# Patient Record
Sex: FEMALE | Race: WHITE | Employment: FULL TIME | URBAN - METROPOLITAN AREA
[De-identification: names, ages, dates, MRNs, and addresses within clinical notes are randomized per-mention and may not be internally consistent; named-entity substitution may affect disease eponyms.]

---

## 2017-04-24 ENCOUNTER — TELEPHONE (OUTPATIENT)
Dept: FAMILY MEDICINE | Facility: CLINIC | Age: 32
End: 2017-04-24

## 2017-04-24 ENCOUNTER — OFFICE VISIT (OUTPATIENT)
Dept: FAMILY MEDICINE | Facility: CLINIC | Age: 32
End: 2017-04-24
Payer: COMMERCIAL

## 2017-04-24 VITALS
DIASTOLIC BLOOD PRESSURE: 71 MMHG | TEMPERATURE: 98.6 F | HEART RATE: 63 BPM | HEIGHT: 69 IN | WEIGHT: 215 LBS | OXYGEN SATURATION: 99 % | SYSTOLIC BLOOD PRESSURE: 123 MMHG | BODY MASS INDEX: 31.84 KG/M2

## 2017-04-24 DIAGNOSIS — R50.9 FEVER, UNSPECIFIED FEVER CAUSE: Primary | ICD-10-CM

## 2017-04-24 LAB
FLUAV+FLUBV AG SPEC QL: NEGATIVE
FLUAV+FLUBV AG SPEC QL: NORMAL
SPECIMEN SOURCE: NORMAL

## 2017-04-24 PROCEDURE — 87804 INFLUENZA ASSAY W/OPTIC: CPT | Performed by: PHYSICIAN ASSISTANT

## 2017-04-24 PROCEDURE — 99213 OFFICE O/P EST LOW 20 MIN: CPT | Performed by: PHYSICIAN ASSISTANT

## 2017-04-24 RX ORDER — PENICILLIN V POTASSIUM 500 MG/1
TABLET, FILM COATED ORAL
Refills: 0 | COMMUNITY
Start: 2017-04-20 | End: 2019-04-02

## 2017-04-24 NOTE — LETTER
[facility]  [address] [facility] [address]  Phone: [phone]    April 24, 2017        Soniya Wheat  [address]  [facility] [address]          To whom it may concern:    RE: Soniya Wheat    Patient was seen and treated today at our clinic and missed work. She will be able to return after she no longer has a fever and cough has improved.     Please contact me for questions or concerns.      Sincerely,        Kristen M. Kehr, PA-C

## 2017-04-24 NOTE — PROGRESS NOTES
SUBJECTIVE:                                                    Soniya Wheat is a 31 year old female who presents to clinic today for the following health issues:    ENT Symptoms             Symptoms: cc Present Absent Comment   Fever/Chills  x     Fatigue  x     Muscle Aches  x     Eye Irritation   x    Sneezing   x    Nasal Pepito/Drg   x    Sinus Pressure/Pain   x    Loss of smell   x    Dental pain  x     Sore Throat   x    Swollen Glands   x    Ear Pain/Fullness  x     Cough   x    Wheeze   x    Chest Pain   x    Shortness of breath   x    Rash   x    Other         Symptom duration: Started last Friday ,got antibiotics ,has 2 abscess teeth which is why on antibiotic   Symptom severity:  moderate   Treatments tried:  antibiotic,ibuprofen   Contacts:  none           Problem list and histories reviewed & adjusted, as indicated.  Additional history: as documented    Patient Active Problem List   Diagnosis     Seborrheic dermatitis     Bilateral low back pain without sciatica     Past Surgical History:   Procedure Laterality Date     NO HISTORY OF SURGERY         Social History   Substance Use Topics     Smoking status: Current Every Day Smoker     Packs/day: 0.50     Years: 15.00     Smokeless tobacco: Former User     Quit date: 6/29/2015     Alcohol use 0.0 oz/week     0 Standard drinks or equivalent per week      Comment: rarely     Family History   Problem Relation Age of Onset     DIABETES Mother      Coronary Artery Disease Mother      Hypertension Mother      Hypertension Paternal Grandmother      CEREBROVASCULAR DISEASE Paternal Grandmother      Coronary Artery Disease Paternal Aunt      Anxiety Disorder Paternal Aunt      Depression Paternal Uncle      Asthma Son          Current Outpatient Prescriptions   Medication Sig Dispense Refill     penicillin V potassium (VEETID) 500 MG tablet   0     No Known Allergies    Reviewed and updated as needed this visit by clinical staff  Tobacco  Allergies  Meds   "Med Hx  Surg Hx  Fam Hx  Soc Hx      Reviewed and updated as needed this visit by Provider         ROS:  Constitutional, HEENT, cardiovascular, pulmonary, gi and gu systems are negative, except as otherwise noted.    OBJECTIVE:                                                    /71  Pulse 63  Temp 98.6  F (37  C) (Oral)  Ht 5' 9\" (1.753 m)  Wt 215 lb (97.5 kg)  LMP 02/13/2017 (Approximate)  SpO2 99%  BMI 31.75 kg/m2  Body mass index is 31.75 kg/(m^2).  GENERAL: healthy, alert and no distress  EYES: Eyes grossly normal to inspection, PERRL and conjunctivae and sclerae normal  HENT: ear canals and TM's normal, nose and mouth without ulcers or lesions  NECK: no adenopathy, no asymmetry, masses, or scars and thyroid normal to palpation  RESP: lungs clear to auscultation - no rales, rhonchi or wheezes  CV: regular rate and rhythm, normal S1 S2, no S3 or S4, no murmur, click or rub, no peripheral edema and peripheral pulses strong  MS: no gross musculoskeletal defects noted, no edema  SKIN: no suspicious lesions or rashes  NEURO: Normal strength and tone, mentation intact and speech normal  PSYCH: mentation appears normal, affect normal/bright    Diagnostic Test Results:  pending     ASSESSMENT/PLAN:                                                      1. Fever, unspecified fever cause  She will continue on the penicillin for the dental abscess, the pain is improving, she has only been taking it for 3 days.   Check influenza testing. I will contact her with results.   Continue with symptomatic treatments with OTC medications also, rest and fluids.   - Influenza A/B antigen      Kristen M. Kehr, PA-C  [facility]  "

## 2017-04-24 NOTE — MR AVS SNAPSHOT
After Visit Summary   4/24/2017    Soniya Wheat    MRN: [id number]           Patient Information     Date Of Birth          1985        Visit Information        Provider Department      4/24/2017 12:00 PM Kehr, Kristen M, PA-C [facility]        Today's Diagnoses     Fever, unspecified fever cause    -  1      Care Instructions    Upper respiratory infections are usually caused by viruses and, sometimes certain bacteria.  Antibiotics don't help the vast majority of people recover any quicker even when caused by a bacteria.  The body will fight this infection but it needs to be treated well in order to help heal itself.  Rest as needed.  It is ok to reduce food intake if appetite is poor but it is quite important to maintain/increase fluid intake.    For cough, dextromethorphan/guaifenesin combinations help loosen secretions and suppress cough safely without significant risk of sedation. Often 2 puffs four times daily of an albuterol inhaler will help with bronchitis.  This is a prescription medicine.    For nasal congestion and sinus pressure, pseudoephedrine (Sudafed) or phenylephrine is often helpful but it can cause elevations in blood pressure and insomnia.  Short courses of a nasal decongestant spray (Afrin or Neosinephrine) can be appropriate but their use should be restricted to 3 days due to the high risk of nasal addiction.    For pain and fevers, acetaminophen (Tylenol) is most appropriate.  Ibuprofen (Advil) or naproxen (Aleve) are useful too and last longer but they can cause elevation of blood pressure or stomach problems.    Antihistamines (Benadryl, Dimetapp, etc.) cause sedation, confusion, bowel and urinary abnormalities and are of little use for infectious causes of cough and nasal congestion.  Their use should be reserved for allergic symptoms.            Follow-ups after your visit        Who to contact     If you have questions or need follow up information  "about today's clinic visit or your schedule please contact [facility] at [phone].  Normal or non-critical lab and imaging results will be communicated to you by MyChart, letter or phone within 4 business days after the clinic has received the results. If you do not hear from us within 7 days, please contact the clinic through [employer] or phone. If you have a critical or abnormal lab result, we will notify you by phone as soon as possible.  Submit refill requests through [employer] or call your pharmacy and they will forward the refill request to us. Please allow 3 business days for your refill to be completed.          Additional Information About Your Visit        [employer] Information     [employer] lets you send messages to your doctor, view your test results, renew your prescriptions, schedule appointments and more. To sign up, go to [email or web address] . Click on \"Log in\" on the left side of the screen, which will take you to the Welcome page. Then click on \"Sign up Now\" on the right side of the page.     You will be asked to enter the access code listed below, as well as some personal information. Please follow the directions to create your username and password.     Your access code is: [email or web address]  Expires: 2017 12:19 PM     Your access code will  in 90 days. If you need help or a new code, please call your [facility] clinic or [phone].        Care EveryWhere ID     This is your Care EveryWhere ID. This could be used by other organizations to access your [facility] medical records  [id number]        Your Vitals Were     Pulse Temperature Height Last Period Pulse Oximetry BMI (Body Mass Index)    63 98.6  F (37  C) (Oral) 5' 9\" (1.753 m) 2017 (Approximate) 99% 31.75 kg/m2       Blood Pressure from Last 3 Encounters:   17 123/71   16 133/83   16 138/85    Weight from Last 3 Encounters:   17 215 lb (97.5 kg)   16 245 lb 12.8 oz (111.5 kg) "   04/29/16 248 lb 12.8 oz (112.9 kg)              We Performed the Following     Influenza A/B antigen          Today's Medication Changes          These changes are accurate as of: 4/24/17 12:19 PM.  If you have any questions, ask your nurse or doctor.               Stop taking these medicines if you haven't already. Please contact your care team if you have questions.     progesterone 200 MG capsule   Commonly known as:  PROMETRIUM   Stopped by:  Kehr, Kristen M, FRACISCO                    Primary Care Provider Office Phone # Fax #    [facility] [phone] [address] [facility] [facility] [address]        Thank you!     Thank you for choosing [facility]  for your care. Our goal is always to provide you with excellent care. Hearing back from our patients is one way we can continue to improve our services. Please take a few minutes to complete the written survey that you may receive in the mail after your visit with us. Thank you!             Your Updated Medication List - Protect others around you: Learn how to safely use, store and throw away your medicines at www.disposemymeds.org.          This list is accurate as of: 4/24/17 12:19 PM.  Always use your most recent med list.                   Brand Name Dispense Instructions for use    penicillin V potassium 500 MG tablet    VEETID

## 2017-04-24 NOTE — NURSING NOTE
"Chief Complaint   Patient presents with     Fever       Initial /71  Pulse 63  Temp 98.6  F (37  C) (Oral)  Ht 5' 9\" (1.753 m)  Wt 215 lb (97.5 kg)  LMP 02/13/2017 (Approximate)  SpO2 99%  BMI 31.75 kg/m2 Estimated body mass index is 31.75 kg/(m^2) as calculated from the following:    Height as of this encounter: 5' 9\" (1.753 m).    Weight as of this encounter: 215 lb (97.5 kg).  Medication Reconciliation: complete  "

## 2017-04-24 NOTE — PATIENT INSTRUCTIONS
Upper respiratory infections are usually caused by viruses and, sometimes certain bacteria.  Antibiotics don't help the vast majority of people recover any quicker even when caused by a bacteria.  The body will fight this infection but it needs to be treated well in order to help heal itself.  Rest as needed.  It is ok to reduce food intake if appetite is poor but it is quite important to maintain/increase fluid intake.    For cough, dextromethorphan/guaifenesin combinations help loosen secretions and suppress cough safely without significant risk of sedation. Often 2 puffs four times daily of an albuterol inhaler will help with bronchitis.  This is a prescription medicine.    For nasal congestion and sinus pressure, pseudoephedrine (Sudafed) or phenylephrine is often helpful but it can cause elevations in blood pressure and insomnia.  Short courses of a nasal decongestant spray (Afrin or Neosinephrine) can be appropriate but their use should be restricted to 3 days due to the high risk of nasal addiction.    For pain and fevers, acetaminophen (Tylenol) is most appropriate.  Ibuprofen (Advil) or naproxen (Aleve) are useful too and last longer but they can cause elevation of blood pressure or stomach problems.    Antihistamines (Benadryl, Dimetapp, etc.) cause sedation, confusion, bowel and urinary abnormalities and are of little use for infectious causes of cough and nasal congestion.  Their use should be reserved for allergic symptoms.

## 2017-04-25 ENCOUNTER — TRANSFERRED RECORDS (OUTPATIENT)
Dept: HEALTH INFORMATION MANAGEMENT | Facility: CLINIC | Age: 32
End: 2017-04-25

## 2017-04-25 LAB — HIV 1&2 EXT: NORMAL

## 2018-12-17 ENCOUNTER — TRANSFERRED RECORDS (OUTPATIENT)
Dept: HEALTH INFORMATION MANAGEMENT | Facility: CLINIC | Age: 33
End: 2018-12-17

## 2018-12-17 LAB — PAP-ABSTRACT: NORMAL

## 2019-04-02 ENCOUNTER — OFFICE VISIT (OUTPATIENT)
Dept: FAMILY MEDICINE | Facility: CLINIC | Age: 34
End: 2019-04-02
Payer: COMMERCIAL

## 2019-04-02 VITALS
DIASTOLIC BLOOD PRESSURE: 72 MMHG | WEIGHT: 225 LBS | OXYGEN SATURATION: 97 % | TEMPERATURE: 97.6 F | SYSTOLIC BLOOD PRESSURE: 129 MMHG | RESPIRATION RATE: 16 BRPM | HEART RATE: 75 BPM | BODY MASS INDEX: 33.23 KG/M2

## 2019-04-02 DIAGNOSIS — J02.9 SORETHROAT: ICD-10-CM

## 2019-04-02 DIAGNOSIS — Z33.1 PREGNANT STATE, INCIDENTAL: ICD-10-CM

## 2019-04-02 DIAGNOSIS — J06.9 UPPER RESPIRATORY TRACT INFECTION, UNSPECIFIED TYPE: Primary | ICD-10-CM

## 2019-04-02 LAB
DEPRECATED S PYO AG THROAT QL EIA: NORMAL
SPECIMEN SOURCE: NORMAL

## 2019-04-02 PROCEDURE — 99214 OFFICE O/P EST MOD 30 MIN: CPT | Performed by: INTERNAL MEDICINE

## 2019-04-02 PROCEDURE — 87081 CULTURE SCREEN ONLY: CPT | Performed by: INTERNAL MEDICINE

## 2019-04-02 PROCEDURE — 87880 STREP A ASSAY W/OPTIC: CPT | Performed by: INTERNAL MEDICINE

## 2019-04-02 RX ORDER — INSULIN PUMP SYRINGE, 3 ML
EACH MISCELLANEOUS
COMMUNITY
Start: 2018-12-27 | End: 2020-07-01

## 2019-04-02 RX ORDER — BLOOD SUGAR DIAGNOSTIC
STRIP MISCELLANEOUS
Refills: 5 | COMMUNITY
Start: 2019-02-13 | End: 2020-07-01

## 2019-04-02 RX ORDER — INSULIN LISPRO 100 [IU]/ML
INJECTION, SOLUTION INTRAVENOUS; SUBCUTANEOUS
Refills: 4 | COMMUNITY
Start: 2019-03-23 | End: 2020-05-28

## 2019-04-02 ASSESSMENT — PAIN SCALES - GENERAL: PAINLEVEL: SEVERE PAIN (7)

## 2019-04-02 NOTE — LETTER
April 3, 2019    Soniya Wheat  [address] [facility] [address]            Dear MsHetal Wheat,    Your throat culture was negative for strep.      Please contact the clinic if you have additional questions.  Thank you.    Sincerely,    Ling Salvador M.D.    Results for orders placed or performed in visit on 04/02/19   Strep, Rapid Screen   Result Value Ref Range    Specimen Description Throat     Rapid Strep A Screen       NEGATIVE: No Group A streptococcal antigen detected by immunoassay, await culture report.   Beta strep group A culture   Result Value Ref Range    Specimen Description Throat     Culture Micro No beta hemolytic Streptococcus Group A isolated

## 2019-04-02 NOTE — NURSING NOTE
"Chief Complaint   Patient presents with     Pharyngitis     c/o sore throat, fever, left ear pain x 2 days cough x 1 day       Initial /72   Pulse 75   Temp 97.6  F (36.4  C) (Oral)   Resp 16   Wt 102.1 kg (225 lb)   SpO2 97%   Breastfeeding? No   BMI 33.23 kg/m   Estimated body mass index is 33.23 kg/m  as calculated from the following:    Height as of 4/24/17: 1.753 m (5' 9\").    Weight as of this encounter: 102.1 kg (225 lb).  Medication Reconciliation: complete  Dolores Quesada MA    "

## 2019-04-02 NOTE — PROGRESS NOTES
SUBJECTIVE:  Soniya Wheat is an 33 year old female who presents for not feeling well.  Four days ago started getting sore throat.  Today having pain in ear and more pain when swallows.  This morning felt feverish and chilled.  Has had cough today as well.  No n/v/d.  No skin rashes.  No recent travel.  Son had strep recently.  No other exposures.  Not having body aches.  Is 7 months pregnant.  Tylenol taken which helps minimally.  Is on insulin for gestational diabetes and sugars are being hard to control.    PMH:  Gestational diabetes. depression  Patient Active Problem List   Diagnosis     Seborrheic dermatitis     Bilateral low back pain without sciatica     Social History     Socioeconomic History     Marital status:      Spouse name: None     Number of children: None     Years of education: None     Highest education level: None   Occupational History     None   Social Needs     Financial resource strain: None     Food insecurity:     Worry: None     Inability: None     Transportation needs:     Medical: None     Non-medical: None   Tobacco Use     Smoking status: Current Every Day Smoker     Packs/day: 0.50     Years: 15.00     Pack years: 7.50     Smokeless tobacco: Former User     Quit date: 6/29/2015   Substance and Sexual Activity     Alcohol use: Yes     Alcohol/week: 0.0 oz     Comment: rarely     Drug use: No     Sexual activity: Yes     Partners: Male     Birth control/protection: None   Lifestyle     Physical activity:     Days per week: None     Minutes per session: None     Stress: None   Relationships     Social connections:     Talks on phone: None     Gets together: None     Attends Denominational service: None     Active member of club or organization: None     Attends meetings of clubs or organizations: None     Relationship status: None     Intimate partner violence:     Fear of current or ex partner: None     Emotionally abused: None     Physically abused: None     Forced sexual  activity: None   Other Topics Concern     Parent/sibling w/ CABG, MI or angioplasty before 65F 55M? Not Asked   Social History Narrative     None     Family History   Problem Relation Age of Onset     Diabetes Mother      Coronary Artery Disease Mother      Hypertension Mother      Hypertension Paternal Grandmother      Cerebrovascular Disease Paternal Grandmother      Coronary Artery Disease Paternal Aunt      Anxiety Disorder Paternal Aunt      Depression Paternal Uncle      Asthma Son        ALLERGIES:  Latex    Current Outpatient Medications   Medication     Blood Glucose Monitoring Suppl (FIFTY50 GLUCOSE METER 2.0) w/Device KIT     ACCU-CHEK SMARTVIEW test strip     HUMALOG KWIKPEN 100 UNIT/ML soln     No current facility-administered medications for this visit.          ROS:  ROS is done and is negative for general/constitutional, eye, ENT, Respiratory, cardiovascular, GI, , Skin, musculoskeletal except as noted elsewhere.  All other review of systems negative except as noted elsewhere.      OBJECTIVE:  /72   Pulse 75   Temp 97.6  F (36.4  C) (Oral)   Resp 16   Wt 102.1 kg (225 lb)   SpO2 97%   Breastfeeding? No   BMI 33.23 kg/m    GENERAL APPEARANCE: Alert, in no acute distress, appears slightly tired  EYES: normal  EARS: External ears normal. Canals clear. TMs with mild clear effusions, no erythema  NOSE:mild clear discharge and mildly inflamed mucosa  OROPHARYNX:normal  NECK:No adenopathy,masses or thyromegaly  RESP: normal and clear to auscultation  CV:regular rate and rhythm and no murmurs, clicks, or gallops  ABDOMEN: Abdomen soft, non-tender. BS normal. No masses, organomegaly  SKIN: no ulcers, lesions or rash  MUSCULOSKELETAL:Musculoskeletal normal      RESULTS  Results for orders placed or performed in visit on 04/02/19   Strep, Rapid Screen   Result Value Ref Range    Specimen Description Throat     Rapid Strep A Screen       NEGATIVE: No Group A streptococcal antigen detected by  immunoassay, await culture report.   .  No results found for this or any previous visit (from the past 48 hour(s)).    ASSESSMENT/PLAN:    ASSESSMENT / PLAN:  (J06.9) Upper respiratory tract infection, unspecified type  (primary encounter diagnosis)  Comment: neg rapid strep.  sxs c/w viral uri.  Not c/w influenza with absence of both body aches and high fevers.  Plan: I reviewed supportive care, otc meds to use if needed, expected course, and signs of concern. As is pregnant,  Has limited choices of otc treatments for sxs. Advised sinus rinses as needed.  Follow up as needed or if she does not improve within 5 day(s) or if worsens in any way.  Reviewed red flag symptoms and is to go to the ER if experiences any of these.    (J02.9) Sorethroat  Comment: neg rapid strep.  Sore throat part of uri as above.  Plan: Strep, Rapid Screen, Beta strep group A culture        Await strep culture and treat if positive.  I reviewed supportive care, otc meds to use if needed, expected course, and signs of concern. As is pregnant,  Has limited choices of otc treatments for sxs. Advised sinus rinses as needed.  Follow up as needed or if she does not improve within 5 day(s) or if worsens in any way.  Reviewed red flag symptoms and is to go to the ER if experiences any of these.      (Z33.1) Pregnant state, incidental  Comment:   Plan: continue routine cares.  Reviewed limited otc options for uri sxs as noted above.      See [facility] for orders, medications, letters, patient instructions    Ling Salvador M.D.

## 2019-04-03 LAB
BACTERIA SPEC CULT: NORMAL
SPECIMEN SOURCE: NORMAL

## 2019-04-10 ENCOUNTER — OFFICE VISIT (OUTPATIENT)
Dept: FAMILY MEDICINE | Facility: CLINIC | Age: 34
End: 2019-04-10
Payer: COMMERCIAL

## 2019-04-10 VITALS
OXYGEN SATURATION: 96 % | DIASTOLIC BLOOD PRESSURE: 77 MMHG | HEART RATE: 81 BPM | RESPIRATION RATE: 18 BRPM | BODY MASS INDEX: 32.49 KG/M2 | SYSTOLIC BLOOD PRESSURE: 138 MMHG | TEMPERATURE: 98.1 F | WEIGHT: 220 LBS

## 2019-04-10 DIAGNOSIS — Z33.1 PREGNANCY, INCIDENTAL: ICD-10-CM

## 2019-04-10 DIAGNOSIS — R05.9 COUGH: Primary | ICD-10-CM

## 2019-04-10 PROCEDURE — 99214 OFFICE O/P EST MOD 30 MIN: CPT | Performed by: INTERNAL MEDICINE

## 2019-04-10 RX ORDER — ALBUTEROL SULFATE 90 UG/1
2 AEROSOL, METERED RESPIRATORY (INHALATION) EVERY 4 HOURS PRN
Qty: 18 G | Refills: 0 | Status: SHIPPED | OUTPATIENT
Start: 2019-04-10 | End: 2020-05-28

## 2019-04-10 RX ORDER — AZITHROMYCIN 250 MG/1
TABLET, FILM COATED ORAL
Qty: 6 TABLET | Refills: 0 | Status: SHIPPED | OUTPATIENT
Start: 2019-04-10 | End: 2019-04-15

## 2019-04-10 NOTE — LETTER
April 10, 2019      Soniya Wheat  [address] [facility] [address]        To Whom It May Concern:    Soniya Wheat was seen in our clinic. She missed work on 4/10/2019 due to illness and may need to miss an additional two days as well.  When her symptoms have improved, she may return to work without restrictions.      Sincerely,        Ling Salvador MD

## 2019-04-10 NOTE — PROGRESS NOTES
SUBJECTIVE:  Soniya Wheat is an 33 year old female who presents for cough and congestion.  About 11 days started with cough and nasal congestion.  Initially seemed like a cold.  Feels tired.  Not check temp, but occasional chills and sweats.  Sometimes Tylenol helps minimally.  Not having body aches.    Is pregnant so limited on med options.  Has gestational diabetes and sugars are being hard to control.  Is on insulin.  Cough has worsened.  Cough keeps her up at night sometimes.  Feels like things have settled in chest.  Mom had pneumonia.  No recent travel.      PMH:   has a past medical history of Depression and NO ACTIVE PROBLEMS.  Patient Active Problem List   Diagnosis     Seborrheic dermatitis     Bilateral low back pain without sciatica     Social History     Socioeconomic History     Marital status:      Spouse name: None     Number of children: None     Years of education: None     Highest education level: None   Occupational History     None   Social Needs     Financial resource strain: None     Food insecurity:     Worry: None     Inability: None     Transportation needs:     Medical: None     Non-medical: None   Tobacco Use     Smoking status: Current Every Day Smoker     Packs/day: 0.50     Years: 15.00     Pack years: 7.50     Smokeless tobacco: Former User     Quit date: 6/29/2015   Substance and Sexual Activity     Alcohol use: Yes     Alcohol/week: 0.0 oz     Comment: rarely     Drug use: No     Sexual activity: Yes     Partners: Male     Birth control/protection: None   Lifestyle     Physical activity:     Days per week: None     Minutes per session: None     Stress: None   Relationships     Social connections:     Talks on phone: None     Gets together: None     Attends Congregational service: None     Active member of club or organization: None     Attends meetings of clubs or organizations: None     Relationship status: None     Intimate partner violence:     Fear of current or ex  partner: None     Emotionally abused: None     Physically abused: None     Forced sexual activity: None   Other Topics Concern     Parent/sibling w/ CABG, MI or angioplasty before 65F 55M? Not Asked   Social History Narrative     None     Family History   Problem Relation Age of Onset     Diabetes Mother      Coronary Artery Disease Mother      Hypertension Mother      Hypertension Paternal Grandmother      Cerebrovascular Disease Paternal Grandmother      Coronary Artery Disease Paternal Aunt      Anxiety Disorder Paternal Aunt      Depression Paternal Uncle      Asthma Son        ALLERGIES:  Latex    Current Outpatient Medications   Medication     ACCU-CHEK SMARTVIEW test strip     Blood Glucose Monitoring Suppl (FIFTY50 GLUCOSE METER 2.0) w/Device KIT     HUMALOG KWIKPEN 100 UNIT/ML soln     No current facility-administered medications for this visit.          ROS:  ROS is done and is negative for general/constitutional, eye, ENT, Respiratory, cardiovascular, GI, , Skin, musculoskeletal except as noted elsewhere.  All other review of systems negative except as noted elsewhere.      OBJECTIVE:  /77   Pulse 81   Temp 98.1  F (36.7  C) (Oral)   Resp 18   Wt 99.8 kg (220 lb)   SpO2 96%   BMI 32.49 kg/m    GENERAL APPEARANCE: Alert, in no acute distress, appears tired  EYES: minimal conjunctival erythema, no drainage  EARS: External ears normal. Canals clear. TMs with mild clear effusions, no erythema  NOSE:mild clear discharge and mildly inflamed mucosa  OROPHARYNX:normal  NECK:No adenopathy,masses or thyromegaly  RESP: no crackles, intermittent end exp wheeze  CV:regular rate and rhythm and no murmurs, clicks, or gallops  ABDOMEN: Abdomen soft, non-tender. BS normal. No masses, organomegaly  SKIN: no ulcers, lesions or rash  MUSCULOSKELETAL:Musculoskeletal normal      RESULTS  Results for orders placed or performed in visit on 04/02/19   Strep, Rapid Screen   Result Value Ref Range    Specimen  Description Throat     Rapid Strep A Screen       NEGATIVE: No Group A streptococcal antigen detected by immunoassay, await culture report.   Beta strep group A culture   Result Value Ref Range    Specimen Description Throat     Culture Micro No beta hemolytic Streptococcus Group A isolated    .  No results found for this or any previous visit (from the past 48 hour(s)).    ASSESSMENT/PLAN:    ASSESSMENT / PLAN:  (R05) Cough  (primary encounter diagnosis)  Comment: cough has worsened rather than improved over the past week. As has worsened, will start her on zmax to cover for possible atypicals.  Also, as has some wheezing so will start on albuterol prn.  As is pregnant and has gestational diabetes, would like to avoid steroids if possible, but if worsens, may need to consider.  Plan: azithromycin (ZITHROMAX) 250 MG tablet,         albuterol (PROAIR HFA/PROVENTIL HFA/VENTOLIN         HFA) 108 (90 Base) MCG/ACT inhaler        Reviewed medication instructions and side effects. Follow up if experiences side effects.. I reviewed supportive care, otc meds to use if needed, expected course, and signs of concern.  Follow up as needed or if she does not improve within 5 day(s) or if worsens in any way.  Reviewed red flag symptoms and is to go to the ER if experiences any of these.  note for work done.      (Z33.1) Pregnancy, incidental  Comment: attempt to limit any medications that would place infant at risk.  Plan: continue routine f/u with ob/gyn    See [facility] for orders, medications, letters, patient instructions    Ling Salvador M.D.

## 2019-06-02 ENCOUNTER — OFFICE VISIT (OUTPATIENT)
Dept: URGENT CARE | Facility: URGENT CARE | Age: 34
End: 2019-06-02
Payer: COMMERCIAL

## 2019-06-02 VITALS
HEART RATE: 87 BPM | WEIGHT: 217 LBS | TEMPERATURE: 98.4 F | SYSTOLIC BLOOD PRESSURE: 132 MMHG | DIASTOLIC BLOOD PRESSURE: 80 MMHG | BODY MASS INDEX: 32.05 KG/M2 | OXYGEN SATURATION: 96 %

## 2019-06-02 DIAGNOSIS — O24.414 INSULIN CONTROLLED GESTATIONAL DIABETES MELLITUS (GDM) IN THIRD TRIMESTER: ICD-10-CM

## 2019-06-02 DIAGNOSIS — J22 LOWER RESPIRATORY INFECTION: ICD-10-CM

## 2019-06-02 DIAGNOSIS — J20.9 BRONCHITIS WITH BRONCHOSPASM: ICD-10-CM

## 2019-06-02 DIAGNOSIS — R05.9 COUGH: ICD-10-CM

## 2019-06-02 DIAGNOSIS — Z20.818 STREPTOCOCCUS EXPOSURE: Primary | ICD-10-CM

## 2019-06-02 LAB
DEPRECATED S PYO AG THROAT QL EIA: NORMAL
SPECIMEN SOURCE: NORMAL

## 2019-06-02 PROCEDURE — 87081 CULTURE SCREEN ONLY: CPT | Performed by: FAMILY MEDICINE

## 2019-06-02 PROCEDURE — 99214 OFFICE O/P EST MOD 30 MIN: CPT | Performed by: FAMILY MEDICINE

## 2019-06-02 PROCEDURE — 87880 STREP A ASSAY W/OPTIC: CPT | Performed by: FAMILY MEDICINE

## 2019-06-02 PROCEDURE — 87798 DETECT AGENT NOS DNA AMP: CPT | Performed by: FAMILY MEDICINE

## 2019-06-02 RX ORDER — AZITHROMYCIN 250 MG/1
TABLET, FILM COATED ORAL
Qty: 6 TABLET | Refills: 0 | Status: SHIPPED | OUTPATIENT
Start: 2019-06-02 | End: 2020-05-28

## 2019-06-02 RX ORDER — ASPIRIN 81 MG/1
81 TABLET, CHEWABLE ORAL
COMMUNITY
Start: 2019-02-11 | End: 2020-05-28

## 2019-06-02 RX ORDER — PREDNISONE 10 MG/1
TABLET ORAL
Qty: 18 TABLET | Refills: 0 | Status: SHIPPED | OUTPATIENT
Start: 2019-06-02 | End: 2020-05-28

## 2019-06-02 RX ORDER — INSULIN HUMAN 100 [IU]/ML
INJECTION, SUSPENSION SUBCUTANEOUS
Refills: 3 | COMMUNITY
Start: 2019-03-08 | End: 2020-05-28

## 2019-06-02 RX ORDER — LORAZEPAM 0.5 MG/1
0.5 TABLET ORAL
COMMUNITY
Start: 2019-05-29 | End: 2020-05-28

## 2019-06-02 RX ORDER — SERTRALINE HYDROCHLORIDE 100 MG/1
100 TABLET, FILM COATED ORAL
COMMUNITY
Start: 2019-05-29 | End: 2020-05-28

## 2019-06-02 NOTE — PROGRESS NOTES
Chief complaint: cough    Patient is 36 weeks pregnant  No history of asthma    Patient had bronchitis is the April and seemed to clear up but now patient is having same symptoms back  Youngest is sick at home - SON HAS STREP THROAT LAST WEEK DIAGNOSED     Patient is a smoker    5-6 days ago started with a dry cough  Patient taking over the counter cough medicine   Is getting worse  Now to the point where if she coughs her chest really hurts  Patient has albuterol and doesn't seem to help   Fever No  Sinus congestion/sinus pain No  Wheezing: yes - hard to breathe   Chest pain no  Exposure to pertussis or pertussis like symptoms: No  Orthopnea, worsening edema, pnd: NO  Rash: NO  Tried OTC medications without relief  No hemoptysis.  Worsening symptoms hence patient came in to be seen     Problem list and histories reviewed & adjusted, as indicated.  Additional history: as documented    Problem list, Medication list, Allergies, and Medical/Social/Surgical histories reviewed in EPIC and updated as appropriate.    ROS:  Constitutional, HEENT, cardiovascular, pulmonary, gi and gu systems are negative, except as otherwise noted.    OBJECTIVE:                                                    /80   Pulse 87   Temp 98.4  F (36.9  C) (Oral)   Wt 98.4 kg (217 lb)   SpO2 96%   Breastfeeding? No   BMI 32.05 kg/m    Body mass index is 32.05 kg/m .  GENERAL: healthy, alert and no distress  EYES: pink palpebral conjunctiva, anicteric sclera  ENT: midline nasal septum normal ear exam. Congested  sinuses.   Mouth: moist buccal mucosa nonhyperemic posterior pharyngeal wall. No tonsillar enlargement or cellulitis  NECK: no adenopathy, no asymmetry, masses, or scars and thyroid normal to palpation  RESP:   Symmetrical chest expansion no retractions patient with bilateral inspiratory and expiratory wheezing with prolonged expiratory phase all throughout lung fields. No rhonchi. No crackles   CV: regular rate and rhythm,  normal S1 S2, no S3 or S4,  No murmurs, click or rub  SKIN: no visible rashes noted  Pscyh: Appropriate mood and affect  MS: no gross musculoskeletal defects noted    Diagnostic Test Results:  Results for orders placed or performed in visit on 06/02/19 (from the past 24 hour(s))   Rapid strep screen   Result Value Ref Range    Specimen Description Throat     Rapid Strep A Screen       NEGATIVE: No Group A streptococcal antigen detected by immunoassay, await culture report.        ASSESSMENT/PLAN:                                                        ICD-10-CM    1. Streptococcus exposure Z20.818 Rapid strep screen     Beta strep group A culture   2. Cough R05 B. pertussis/parapertussis PCR-NP     Rapid strep screen     Beta strep group A culture   3. Insulin controlled gestational diabetes mellitus (GDM) in third trimester O24.414    4. Bronchitis with bronchospasm J20.9 predniSONE (DELTASONE) 10 MG tablet   5. Lower respiratory infection J22 azithromycin (ZITHROMAX) 250 MG tablet        High risk for infection given DM and recent strep exposure  Coughing spasms as well - but associated with wheezing - low clinical suspicion for pertussis however because of patient being third trimester at 36 weeks, recommend pertussis testing.   Prescribed with zithromax    Patient already has albuterol at home - spacer use encouraged  Patient wheezing and tight. Oxygen is normal.   Risks vs benefits of prednisone use discussed in detail. Risks to pregnancy discussed in detail as well.  However given patient tight airways with wheezing this could also be harmful to her pregnancy  Patient also aware prednisone may increase her blood sugars - patient is controlled with insulin for her GDM.  Long discussion of risks vs benefits and patient would like to be on the prednisone  Lower dose prescribed to avoid potential side effects  She will call her obgyn to let them know prior to starting the medication.  She will also call her oncall  DM educator.   Advised to keep close track of her blood sugars  Alarm signs or symptoms discussed, if present recommend go to ER     Recommend follow up in clinic in 1-2 days for recheck oxygen and breathing.  To ER if with worsening symptoms or no relief    You are being screened for Pertussis or whooping cough  Recommend to be isolated until Pertussis swab results are back and start taking the antibiotic.  If negative, may discontinue isolation and just continue antibiotic zithromax or azithromycin  If positive, will need to be isolated until finished with the antibiotic. Close contacts may need to inform their providers that they were in contact with you as this is a public health concern.  Please come back in if you have any worsening symptoms or if no relief despite treatment plan.     Adverse reactions of medications discussed.  Over the counter medications discussed.   Aware to come back in if with worsening symptoms or if no relief despite treatment plan  Patient voiced understanding and had no further questions.     MD Swetha Steele MD  [facility]

## 2019-06-02 NOTE — LETTER
June 4, 2019    Soniya Wheat  [address] [address] [facility] [address]            Dear Soniya,    The results of your recent tests were normal.  Below is a copy of the results.  It was a pleasure to see you at your last appointment.    If you have any questions or concerns, please call myself or my nurse at [phone].    Sincerely,    Rebecca Rinaldi, APRN CNP/klf    Results for orders placed or performed in visit on 06/02/19   B. pertussis/parapertussis PCR-NP   Result Value Ref Range    B Pertussis PCR Not Detected NDET^Not Detected    B parapertussis PCR Not Detected NDET^Not Detected    Bordetella comment SEE NOTE    Rapid strep screen   Result Value Ref Range    Specimen Description Throat     Rapid Strep A Screen       NEGATIVE: No Group A streptococcal antigen detected by immunoassay, await culture report.   Beta strep group A culture   Result Value Ref Range    Specimen Description Throat     Culture Micro No beta hemolytic Streptococcus Group A isolated

## 2019-06-02 NOTE — LETTER
[facility]  [address] [facility] [address]  Phone: [phone]    June 2, 2019        Soniya Wheat  [address] [address] [address]          To whom it may concern:    RE: Soniya Wheat    Patient was seen and treated today at our clinic.  Recommend staying home until test results come back and are negative  Results may take up to 3 days.     Please contact me for questions or concerns.      Sincerely,        Swetha Escamilla MD

## 2019-06-03 LAB
B PARAPERT DNA SPEC QL NAA+PROBE: NOT DETECTED
B PERT DNA SPEC QL NAA+PROBE: NOT DETECTED
BACTERIA SPEC CULT: NORMAL
BORDETELLA COMMENT: NORMAL
SPECIMEN SOURCE: NORMAL

## 2019-06-04 ENCOUNTER — TELEPHONE (OUTPATIENT)
Dept: FAMILY MEDICINE | Facility: CLINIC | Age: 34
End: 2019-06-04

## 2019-06-04 NOTE — TELEPHONE ENCOUNTER
Patient calling regarding pertussis results  Patient informed of negative pertussis result  Patient verbalized understanding  No further concerns      Helen JOLLYN, RN, CPN

## 2019-07-01 ENCOUNTER — TELEPHONE (OUTPATIENT)
Dept: FAMILY MEDICINE | Facility: CLINIC | Age: 34
End: 2019-07-01

## 2019-07-01 NOTE — TELEPHONE ENCOUNTER
Panel Management Review      Patient has the following on her problem list: None      Composite cancer screening  Chart review shows that this patient is due/due soon for the following Pap Smear  Summary:    Patient is due/failing the following:   PAP    Action needed:   Patient needs office visit for pap. Patient is currently pregnant and seeing a provider in [facility] for that. Will wait a couple months due to delivering soon.    Type of outreach:    Sent letter.    Questions for provider review:    None                                                                                                                                    CROW Storey       Chart routed to none .

## 2019-11-19 ENCOUNTER — TELEPHONE (OUTPATIENT)
Dept: FAMILY MEDICINE | Facility: CLINIC | Age: 34
End: 2019-11-19

## 2019-11-19 NOTE — TELEPHONE ENCOUNTER
Panel Management Review      Patient has the following on her problem list: None      Composite cancer screening  Chart review shows that this patient is due/due soon for the following None  Summary:    Patient is due/failing the following:   PHYSICAL, patient had a pap 12/17/18 at [facility]     Action needed:   Patient needs office visit for physical.    Type of outreach:    Sent letter.    Questions for provider review:    None                                                                                                                                    CROW Storey       Chart routed to none .

## 2020-05-26 ENCOUNTER — VIRTUAL VISIT (OUTPATIENT)
Dept: FAMILY MEDICINE | Facility: OTHER | Age: 35
End: 2020-05-26

## 2020-05-26 NOTE — PROGRESS NOTES
"Date: 2020 10:51:55  Clinician: Ryne Gleason  Clinician NPI: [id number]  Patient: Soniya Wheat  Patient : 1985  Patient Address: [address] [place], [place] [address]  Patient Phone: [phone]  Visit Protocol: URI  Patient Summary:  Soniya is a 35 year old ( : 1985 ) female who initiated a Visit for cold, sinus infection, or influenza. When asked the question \"Please sign me up to receive news, health information and promotions from [employer].\", Soniya responded \"No\".    Soniya states her symptoms started gradually 3-4 days ago.   Her symptoms consist of tooth pain, facial pain or pressure, chills, a sore throat, enlarged lymph nodes, nasal congestion, ear pain, a headache, malaise, and myalgia. She is experiencing difficulty breathing due to nasal congestion but she is not short of breath.   Symptom details     Nasal secretions: The color of her mucus is green and yellow.    Sore throat: Soniya reports having moderate throat pain (4-6 on a 10 point pain scale), does not have exudate on her tonsils, and can swallow liquids. The lymph nodes in her neck are enlarged. A rash has appeared on the skin since the sore throat started. Soniya uploaded photos of her rash (see below).     Facial pain or pressure: The facial pain or pressure feels worse when bending over or leaning forward.     Headache: She states the headache is mild (1-3 on a 10 point pain scale).     Tooth pain: The tooth pain is caused by a cavity, recent dental work, or other mouth problems.      Soniya denies having nausea, ageusia, diarrhea, wheezing, fever, cough, vomiting, rhinitis, and anosmia. She also denies having recent facial or sinus surgery in the past 60 days, double sickening (worsening symptoms after initial improvement), taking antibiotic medication for the symptoms, and having a sinus infection within the past year.   Precipitating events  Within the past week, Soniya has not been exposed to " someone with strep throat. She has not recently been exposed to someone with influenza. Soniya has been in close contact with the following high risk individuals: people with asthma, heart disease or diabetes, adults 65 or older, and immunocompromised people.   Pertinent COVID-19 (Coronavirus) information  In the past 14 days, Soniya has not worked in a congregate living setting.   She does not work or volunteer as healthcare worker or a  and does not work or volunteer in a healthcare facility.   Soniya also has not lived in a congregate living setting in the past 14 days. She does not live with a healthcare worker.   Soniya has not had a close contact with a laboratory-confirmed COVID-19 patient within 14 days of symptom onset.   Pertinent medical history  Soniya does not get yeast infections when she takes antibiotics.   Soniya needs a return to work/school note.   Weight: 250 lbs   Soniya smokes or uses smokeless tobacco.   She denies pregnancy and is breastfeeding. Her last period was over a month ago.   Weight: 250 lbs    MEDICATIONS: quetiapine oral, duloxetine oral, ALLERGIES: NKDA  Clinician Response:  Dear Soniya,   Dear Soniya  Your symptoms show that you may have coronavirus (COVID-19). This illness can cause fever, cough and trouble breathing. Many people get a mild case and get better on their own. Some people can get very sick.  Will I be tested for COVID-19?  Not all patients are tested for COVID-19. If you need to be tested, your care team will let you know. You may request testing if:   You are very ill. For example, you're on chemotherapy, dialysis or home hospice care. (Contact your specialty clinic or program.)   You live in a nursing home or other long-term care facility. (Talk to your nurse manager or medical director.)   You're a health care worker. (Contact your employee health office.)   We are performing limited curbside testing for healthcare/first  "responders and people with medical problems that put them at increased risk. It does not appear by the [facility] information you submitted that you meet any of these criteria. If there are medical problems that we did not know about, please repeat an [facility] visit and let us know what medical conditions you have.   How can I protect others?  Without a test, we can't know for sure that you have COVID-19. For safety, it's very important to follow these rules.  First, stay home and away from others (self-isolate) until:   You've had no fever---and no medicine that reduces fever---for 3 full days (72 hours). And...    Your other symptoms have gotten better. For example, your cough or breathing has improved. And...   At least 10 days have passed since your symptoms started.   During this time:   Stay in your own room (and use your own bathroom), if you can.   Stay away from others in your home. No hugging, kissing or shaking hands.   Don't let anyone visit.   Don't go to work, school or anywhere else.    Clean \"high touch\" surfaces often (doorknobs, counters, handles, etc.). Use a household cleaning spray or wipes.   Cover your mouth and nose with a mask, tissue or washcloth to avoid spreading germs.   Wash your hands and face often. Use soap and water.   How can I take care of myself?   1.Get lots of rest. Drink extra fluids (unless a doctor has told you not to).                  2.Take Tylenol (acetaminophen) for fever or pain. If you have liver or kidney problems, ask your family doctor if it's okay to take Tylenol.        Adults can take either:    650 mg (two 325 mg pills) every 4 to 6 hours, or...   1,000 mg (two 500 mg pills) every 8 hours as needed.    Note: Don't take more than 3,000 mg in one day. Acetaminophen is found in many medicines (both prescribed and over-the-counter medicines). Read all labels to be sure you don't take too much.    For children, check the Tylenol bottle for the right dose. The dose is " based on the child's age or weight.   3.If you have other health problems (like cancer, heart failure, an organ transplant or severe kidney disease): Call your specialty clinic if you don't feel better in the next 2 days.       4.Know when to call 911: If your breathing is so bad that it keeps you from doing normal activities, call 911 or go to the emergency room. Tell them that you've been staying home and may have COVID-19.       5.Sign up for [employer]. We know it's scary to hear that you might have COVID-19. We want to track your symptoms to make sure you're okay over the next 2 weeks. Please look for an email from [employer]---this is a free, online program that we'll use to keep in touch. To sign up, follow the link in the email. Learn more at [email or web address].   Where can I get more information?  For more about COVID-19 and caring for yourself at home, please visit the CDC website at https://www.cdc.gov/coronavirus/2019-ncov/about/steps-when-sick.html.  To learn about care at [facility], please visit [email or web address]         If you'd like to be part of a COVID-19 clinical trial (research study) at the [facility], go to [email or web address] for details.     Diagnosis: Acute upper respiratory infection, unspecified  Diagnosis ICD: J06.9

## 2020-05-27 NOTE — PROGRESS NOTES
"Subjective     Soniya Wheat is a 35 year old female who presents to clinic today for the following health issues:    HPI   ENT Symptoms             Symptoms: cc Present Absent Comment   Fever/Chills       Fatigue       Muscle Aches       Eye Irritation       Sneezing       Nasal Pepito/Drg       Sinus Pressure/Pain       Loss of smell       Dental pain       Sore Throat       Swollen Glands       Ear Pain/Fullness       Cough       Wheeze       Chest Pain       Shortness of breath       Rash       Other         Symptom duration:  ***   Symptom severity:  ***   Treatments tried:  ***   Contacts:  ***     ***phq2    {additonal problems for provider to add (Optional):531167}    {HIST REVIEW/ LINKS 2 (Optional):620189}    {Additional problems for the provider to add (optional):765364}  Reviewed and updated as needed this visit by Provider         Review of Systems   {ROS COMP (Optional):032437}      Objective    There were no vitals taken for this visit.  There is no height or weight on file to calculate BMI.  Physical Exam   {Exam List (Optional):415385}    {Diagnostic Test Results (Optional):617390::\"Diagnostic Test Results:\",\"Labs reviewed in Epic\"}        {PROVIDER CHARTING PREFERENCE:896315}        "

## 2020-05-28 ENCOUNTER — OFFICE VISIT (OUTPATIENT)
Dept: FAMILY MEDICINE | Facility: CLINIC | Age: 35
End: 2020-05-28
Payer: COMMERCIAL

## 2020-05-28 DIAGNOSIS — R09.81 NASAL CONGESTION: Primary | ICD-10-CM

## 2020-05-28 PROCEDURE — 99213 OFFICE O/P EST LOW 20 MIN: CPT | Mod: GT | Performed by: PHYSICIAN ASSISTANT

## 2020-05-28 RX ORDER — DULOXETIN HYDROCHLORIDE 30 MG/1
150 CAPSULE, DELAYED RELEASE ORAL
COMMUNITY
Start: 2019-12-06 | End: 2020-07-01

## 2020-05-28 RX ORDER — FLUTICASONE PROPIONATE 50 MCG
1 SPRAY, SUSPENSION (ML) NASAL DAILY
Qty: 16 G | Refills: 5 | Status: SHIPPED | OUTPATIENT
Start: 2020-05-28 | End: 2020-07-01

## 2020-05-28 NOTE — PROGRESS NOTES
"Soniya Wheat is a 35 year old female who is being evaluated via a billable video visit.      The patient has been notified of following:     \"This video visit will be conducted via a call between you and your physician/provider. We have found that certain health care needs can be provided without the need for an in-person physical exam.  This service lets us provide the care you need with a video conversation.  If a prescription is necessary we can send it directly to your pharmacy.  If lab work is needed we can place an order for that and you can then stop by our lab to have the test done at a later time.    Video visits are billed at different rates depending on your insurance coverage.  Please reach out to your insurance provider with any questions.    If during the course of the call the physician/provider feels a video visit is not appropriate, you will not be charged for this service.\"    Patient has given verbal consent for Video visit? Yes    How would you like to obtain your AVS? declined    Patient would like the video invitation sent by: Text to cell phone: [phone]    Will anyone else be joining your video visit? No      Subjective     Soniya Wheat is a 35 year old female who presents today via video visit for the following health issues:    She had an [facility] visit and presumptive COVID-19. She is taking precautions with isolation. She has a history of allergies and believes her congestion may have started with allergies, she uses zyrtec. The left side of her nose and left maxillary sinus is affected. She has a low grade fever. Symptoms have been present x 7 days. She is unable to get COVID testing due to limitations in the amount of the tests right now.  She is not high risk or meet the guidelines through [place] for testing right now.      HPI  ENT Symptoms             Symptoms: cc Present Absent Comment   Fever/Chills  x  Around 100.8   Fatigue   x    Muscle Aches   x    Eye Irritation   " x    Sneezing   x    Nasal Pepito/Drg  x  stuffy   Sinus Pressure/Pain  x     Loss of smell   x    Dental pain   x    Sore Throat  x  left side only   Swollen Glands  x  Left side   Ear Pain/Fullness  x  Left    Cough   x    Wheeze   x    Chest Pain   x    Shortness of breath   x    Rash   x    Other  x  Headache in left eye      Symptom duration:  1 week    Symptom severity:     Treatments tried:  zyrtec and ibuprofen    Contacts:  no            Video Start Time: 9:10 am        Patient Active Problem List   Diagnosis     Seborrheic dermatitis     Bilateral low back pain without sciatica     Insulin controlled gestational diabetes mellitus (GDM) in third trimester     Past Surgical History:   Procedure Laterality Date     NO HISTORY OF SURGERY         Social History     Tobacco Use     Smoking status: Current Every Day Smoker     Packs/day: 0.50     Years: 15.00     Pack years: 7.50     Smokeless tobacco: Former User     Quit date: 6/29/2015   Substance Use Topics     Alcohol use: Yes     Alcohol/week: 0.0 standard drinks     Comment: rarely     Family History   Problem Relation Age of Onset     Diabetes Mother      Coronary Artery Disease Mother      Hypertension Mother      Hypertension Paternal Grandmother      Cerebrovascular Disease Paternal Grandmother      Coronary Artery Disease Paternal Aunt      Anxiety Disorder Paternal Aunt      Depression Paternal Uncle      Asthma Son          Allergies   Allergen Reactions     Latex Hives     BP Readings from Last 3 Encounters:   06/02/19 132/80   04/10/19 138/77   04/02/19 129/72    Wt Readings from Last 3 Encounters:   06/02/19 98.4 kg (217 lb)   04/10/19 99.8 kg (220 lb)   04/02/19 102.1 kg (225 lb)                    Reviewed and updated as needed this visit by Provider  Tobacco  Allergies  Meds  Problems  Med Hx  Surg Hx  Fam Hx         Review of Systems   Constitutional, HEENT, cardiovascular, pulmonary, GI, , musculoskeletal, neuro, skin, endocrine and  "psych systems are negative, except as otherwise noted.      Objective    There were no vitals taken for this visit.  Estimated body mass index is 32.05 kg/m  as calculated from the following:    Height as of 4/24/17: 1.753 m (5' 9\").    Weight as of 6/2/19: 98.4 kg (217 lb).  Physical Exam     GENERAL: Healthy, alert and no distress  EYES: Eyes grossly normal to inspection.  No discharge or erythema, or obvious scleral/conjunctival abnormalities.  RESP: No audible wheeze, cough, or visible cyanosis.  No visible retractions or increased work of breathing.    SKIN: Visible skin clear. No significant rash, abnormal pigmentation or lesions.  NEURO: Cranial nerves grossly intact.  Mentation and speech appropriate for age.  PSYCH: Mentation appears normal, affect normal/bright, judgement and insight intact, normal speech and appearance well-groomed.      Diagnostic Test Results:  Labs reviewed in Epic        Assessment & Plan     1. Nasal congestion  She is going to add flonase and continue with zyrtec. With her low grade fever and mild symptoms, COVID-19 can not be ruled out. Unable to test (see above). She will continue to quarantine and avoid contact with others. Watch any family members for signs and symptoms.   If symptoms of sinus pain and pressure increase or remain after 14 days, she will send a [employer] message and I will plan to treat her for a sinus infection. Reviewed appropriate use of antibiotics and that they would not be used for COVID / viral infections and that bacterial sinusitis will take 14 days to develop.   - fluticasone (FLONASE) 50 MCG/ACT nasal spray; Spray 1 spray into both nostrils daily  Dispense: 16 g; Refill: 5       Return in about 1 week (around 6/4/2020) for as needed if symptoms worsen or persist.    Kristen M. Kehr, PA-C  [facility]      Video-Visit Details    Type of service:  Video Visit    Video End Time:9:18 am    Originating Location (pt. Location): Home    Distant " Location (provider location):  [facility]     Platform used for Video Visit: Doximity    Return in about 1 week (around 6/4/2020) for as needed if symptoms worsen or persist.       Kristen M. Kehr, PA-C

## 2020-07-01 ENCOUNTER — VIRTUAL VISIT (OUTPATIENT)
Dept: FAMILY MEDICINE | Facility: CLINIC | Age: 35
End: 2020-07-01
Payer: COMMERCIAL

## 2020-07-01 DIAGNOSIS — S05.01XA ABRASION OF RIGHT CORNEA, INITIAL ENCOUNTER: Primary | ICD-10-CM

## 2020-07-01 PROCEDURE — 99213 OFFICE O/P EST LOW 20 MIN: CPT | Mod: 95 | Performed by: PHYSICIAN ASSISTANT

## 2020-07-01 RX ORDER — OFLOXACIN 3 MG/ML
1-2 SOLUTION/ DROPS OPHTHALMIC 4 TIMES DAILY
Qty: 1 BOTTLE | Refills: 0 | Status: SHIPPED | OUTPATIENT
Start: 2020-07-01

## 2020-07-01 NOTE — PROGRESS NOTES
"Soniya Wheat is a 35 year old female who is being evaluated via a billable video visit.      The patient has been notified of following:     \"This video visit will be conducted via a call between you and your physician/provider. We have found that certain health care needs can be provided without the need for an in-person physical exam.  This service lets us provide the care you need with a video conversation.  If a prescription is necessary we can send it directly to your pharmacy.  If lab work is needed we can place an order for that and you can then stop by our lab to have the test done at a later time.    Video visits are billed at different rates depending on your insurance coverage.  Please reach out to your insurance provider with any questions.    If during the course of the call the physician/provider feels a video visit is not appropriate, you will not be charged for this service.\"    Patient has given verbal consent for Video visit? Yes  How would you like to obtain your AVS? none  Patient would like the video invitation sent by: Text to cell phone: [phone]  Will anyone else be joining your video visit? No      Subjective     Soniya Wheat is a 35 year old female who presents today via video visit for the following health issues:    HPI  Eye(s) Problem      Duration: last night     Description:  Location: right eye  Pain: YES  Redness: YES  Discharge: YES    Accompanying signs and symptoms: light sensitivity     History (Trauma, foreign body exposure,): daughter scratched her eye    Precipitating or alleviating factors (contact use): None    Therapies tried and outcome: saline rinse  She states last night her daughter scratched her right eye.  Now is watery and sensitive to light.  She does feel like to something in her eye.  She does wear contacts.  She does have regular eye doctor.  She denies any visual changes besides that her eye is watery.    Video Start Time: 10:14 AM    Patient Active " Problem List   Diagnosis     Seborrheic dermatitis     Bilateral low back pain without sciatica     Insulin controlled gestational diabetes mellitus (GDM) in third trimester     Past Surgical History:   Procedure Laterality Date     NO HISTORY OF SURGERY         Social History     Tobacco Use     Smoking status: Current Every Day Smoker     Packs/day: 0.50     Years: 15.00     Pack years: 7.50     Smokeless tobacco: Former User     Quit date: 6/29/2015   Substance Use Topics     Alcohol use: Yes     Alcohol/week: 0.0 standard drinks     Comment: rarely     Family History   Problem Relation Age of Onset     Diabetes Mother      Coronary Artery Disease Mother      Hypertension Mother      Hypertension Paternal Grandmother      Cerebrovascular Disease Paternal Grandmother      Coronary Artery Disease Paternal Aunt      Anxiety Disorder Paternal Aunt      Depression Paternal Uncle      Asthma Son          Current Outpatient Medications   Medication Sig Dispense Refill     DULoxetine (CYMBALTA) 30 MG capsule 150 mg        ofloxacin (OCUFLOX) 0.3 % ophthalmic solution Place 1-2 drops into the right eye 4 times daily 1 Bottle 0     Prenatal Vit-Fe Fumarate-FA (PRENAPLUS PO) Take 1 tablet by mouth       ACCU-CHEK SMARTVIEW test strip TEST 4 TIMES A DAY.  5     Blood Glucose Monitoring Suppl (FIFTY50 GLUCOSE METER 2.0) w/Device KIT Dispense meter, test strips, lancets covered by pt ins. O99.810 Gestational DM - Test 4 times/day. New diabetes       fluticasone (FLONASE) 50 MCG/ACT nasal spray Spray 1 spray into both nostrils daily 16 g 5     Allergies   Allergen Reactions     Latex Hives       Reviewed and updated as needed this visit by Provider  Tobacco  Allergies  Meds  Problems  Med Hx  Surg Hx  Fam Hx         Review of Systems   Constitutional, HEENT, cardiovascular, pulmonary, gi and gu systems are negative, except as otherwise noted.      Objective             Physical Exam     GENERAL: Healthy, alert and no  distress  EYES: Eyes grossly normal to inspection.  No discharge or erythema, or obvious scleral/conjunctival abnormalities.   Though the video quality was not the best I could tell her right eye was watery and erythematous.  It appeared that she was sensitive to light and she had a hard time keeping the eye open up.  RESP: No audible wheeze, cough, or visible cyanosis.  No visible retractions or increased work of breathing.    SKIN: Visible skin clear. No significant rash, abnormal pigmentation or lesions.  NEURO: Cranial nerves grossly intact.  Mentation and speech appropriate for age.  PSYCH: Mentation appears normal, affect normal/bright, judgement and insight intact, normal speech and appearance well-groomed.      Diagnostic Test Results:  Labs reviewed in Epic        Assessment & Plan       ICD-10-CM    1. Abrasion of right cornea, initial encounter  S05.01XA ofloxacin (OCUFLOX) 0.3 % ophthalmic solution   To patient about her concerns regarding her eye.  We will get her started on some eyedrops.  We will have her not wear her contacts for least a week.  Warning signs were discussed.  She is not improving or things worsen she is seeing her eye doctor.    Return in about 2 days (around 7/3/2020) for recheck, As Needed.    Shilo Hunt PA-C  [facility]      Video-Visit Details    Type of service:  Video Visit    Video End Time:10:22 AM    Originating Location (pt. Location): Home    Distant Location (provider location):  [facility]     Platform used for Video Visit: Doximity    Return in about 2 days (around 7/3/2020) for recheck, As Needed.       Shilo Hunt PA-C

## 2020-07-01 NOTE — Clinical Note
Please abstract the following data from this visit with this patient into the appropriate field in Epic:    Tests that can be patient reported without a hard copy:    {Quality Abstract List (Optional):258216}    Other Tests found in the patient's chart through Chart Review/Care Everywhere:    HIV done by [facility] on this date: 4/25/17    Note to Abstraction: If this section is blank, no results were found via Chart Review/Care Everywhere.

## 2021-04-22 ENCOUNTER — TELEPHONE (OUTPATIENT)
Dept: FAMILY MEDICINE | Facility: CLINIC | Age: 36
End: 2021-04-22

## 2021-04-22 NOTE — TELEPHONE ENCOUNTER
Patient Quality Outreach      Summary:    Patient has the following on her problem list/HM: abstracting    Patient is due/failing the following:   Cervical Cancer Screening - PAP Needed    Type of outreach:    none, abstracting    Questions for provider review:    None                                                                                                                                     Margy Kim CMA       Chart routed to closed.

## 2022-02-11 ENCOUNTER — OFFICE VISIT (OUTPATIENT)
Dept: URGENT CARE | Facility: URGENT CARE | Age: 37
End: 2022-02-11

## 2022-02-11 VITALS
RESPIRATION RATE: 22 BRPM | HEART RATE: 66 BPM | SYSTOLIC BLOOD PRESSURE: 135 MMHG | DIASTOLIC BLOOD PRESSURE: 84 MMHG | OXYGEN SATURATION: 97 % | TEMPERATURE: 98.8 F

## 2022-02-11 DIAGNOSIS — H73.892 ERYTHEMA OF TYMPANIC MEMBRANE OF LEFT EAR: ICD-10-CM

## 2022-02-11 DIAGNOSIS — H61.22 IMPACTED CERUMEN OF LEFT EAR: Primary | ICD-10-CM

## 2022-02-11 PROCEDURE — 99213 OFFICE O/P EST LOW 20 MIN: CPT | Performed by: NURSE PRACTITIONER

## 2022-02-11 RX ORDER — AMOXICILLIN 500 MG/1
500 CAPSULE ORAL 3 TIMES DAILY
Qty: 21 CAPSULE | Refills: 0 | Status: SHIPPED | OUTPATIENT
Start: 2022-02-11 | End: 2022-02-18

## 2022-02-11 ASSESSMENT — PAIN SCALES - GENERAL: PAINLEVEL: NO PAIN (0)

## 2022-02-12 NOTE — PATIENT INSTRUCTIONS
Patient Education     Impacted Earwax     Inner ear structures including ear canal and eardrum.   Impacted earwax is a buildup of the natural wax in the ear. Impacted earwax is very common. It can cause symptoms such as hearing loss. It can also make it hard for a healthcare provider to check your ear.   Understanding earwax  Tiny glands in your ear make substances that combine with dead skin cells to form earwax. Earwax helps protect your ear canal from water, dirt, infection, and injury. Over time, earwax travels from the inner part of your ear canal to the entrance of the canal. Then it falls away naturally. But in some cases, it can t travel to the entrance of the canal. This may be because of a health condition or objects put in the ear. With age, earwax tends to become harder and less fluid. Older adults are more likely to have problems with earwax buildup.   What causes impacted earwax?  Earwax can build up because of many health conditions. Some cause a physical blockage. Others cause too much earwax to be made. Health conditions that can cause earwax buildup include:     Bony blockage in the ear (osteoma or exostoses)    Infections, such as an outer ear infection (external otitis)    Skin disease, such as eczema    Autoimmune diseases, such as lupus    A narrowed ear canal from birth, chronic inflammation, or injury    Too much earwax because of injury    Too much earwax because of  water in the ear canal  Putting objects in the ear again and again can also cause impacted earwax. For example, putting cotton swabs in the ear may push the wax deeper into the ear. Over time, this may cause blockage. Hearing aids, swimming plugs, and swim molds can also cause this problem when used again and again.   In some cases, the cause of impacted earwax is not known.  Symptoms of impacted earwax  Excess earwax often does not cause any symptoms, unless there is a large amount of buildup. Then it may cause symptoms such  as:     Hearing loss    Earache    Sense of ear fullness    Itching in the ear    Odor from the ear    Ear drainage    Dizziness    Ringing in the ears    Cough  Treatment for impacted earwax  If you don t have symptoms, you may not need treatment. Often the earwax goes away on its own with time. If you have symptoms, you may have 1 or more treatments such as:     Ear drops to soften the earwax. This helps it leave the ear over time.    Rinsing the ear canal with water. This is done in a healthcare provider s office.    Removing the earwax with small tools. This is also done in a provider s office.  In rare cases, some treatments for earwax removal may cause complications such as:    Outer ear infection    Earache    Short-term hearing loss    Dizziness    Water trapped in the ear canal    Hole in the eardrum    Ringing in the ears    Bleeding from the ear  Talk with your healthcare provider about which risks apply most to you.  Healthcare providers don't advise using ear candles or ear vacuum kits. These methods are not shown to work and may cause problems.   Preventing impacted earwax  You may not be able to prevent impacted earwax if you have a health condition that causes it, such as eczema. In other cases, you may be able to prevent earwax buildup by:     Using ear drops once a week    Having a regular ear cleaning about every 6 months    Not using cotton swabs in the ear  When to call the healthcare provider  Call your healthcare provider right away if you have:     Symptoms of impacted earwax    Severe symptoms after earwax removal, such as bleeding or severe ear pain    Jose Francisco last reviewed this educational content on 9/1/2019 2000-2021 [employer]. All rights reserved. This information is not intended as a substitute for professional medical care. Always follow your healthcare professional's instructions.

## 2022-02-12 NOTE — PROGRESS NOTES
SUBJECTIVE:   Soniya Wheat is a 36 year old female presenting with a chief complaint of   Chief Complaint   Patient presents with     Ear Problem     Patient pushed on the back of the left ear today and it became plugged and she has not been able to hear out of it- states that she becomes dizzy with her equalibrium off.       She is an established patient of [facility].    SUBJECTIVE  Soniya Wheat is presenting to urgent care with complaints of not being able to hear very well from the left ear.  She reports that she pushed the back of her left ear today and felt it plugged.  She has been having dizziness on and off since then.  She can hear from the left ear but not very clear like the right ear.  She denies ear pain.     Review of Systems   HENT:        Left ear feels  Decreased hearing on the left ear   All other systems reviewed and are negative.      Past Medical History:   Diagnosis Date     Depression     high school     NO ACTIVE PROBLEMS      Family History   Problem Relation Age of Onset     Diabetes Mother      Coronary Artery Disease Mother      Hypertension Mother      Hypertension Paternal Grandmother      Cerebrovascular Disease Paternal Grandmother      Coronary Artery Disease Paternal Aunt      Anxiety Disorder Paternal Aunt      Depression Paternal Uncle      Asthma Son      Current Outpatient Medications   Medication Sig Dispense Refill     ofloxacin (OCUFLOX) 0.3 % ophthalmic solution Place 1-2 drops into the right eye 4 times daily (Patient not taking: Reported on 2/11/2022) 1 Bottle 0     Prenatal Vit-Fe Fumarate-FA (PRENAPLUS PO) Take 1 tablet by mouth (Patient not taking: Reported on 2/11/2022)       Social History     Tobacco Use     Smoking status: Current Every Day Smoker     Packs/day: 0.50     Years: 15.00     Pack years: 7.50     Smokeless tobacco: Former User     Quit date: 6/29/2015   Substance Use Topics     Alcohol use: Yes     Alcohol/week: 0.0 standard drinks      Comment: rarely       OBJECTIVE  /84   Pulse 66   Temp 98.8  F (37.1  C) (Tympanic)   Resp 22   SpO2 97%     Physical Exam  HENT:      Right Ear: Tympanic membrane normal.      Left Ear: There is impacted cerumen. Tympanic membrane is erythematous (after flushing ear).   Cardiovascular:      Rate and Rhythm: Normal rate.      Heart sounds: Normal heart sounds, S1 normal and S2 normal.   Pulmonary:      Effort: Pulmonary effort is normal.      Breath sounds: Normal breath sounds.   Neurological:      General: No focal deficit present.      Mental Status: She is alert.   Psychiatric:         Attention and Perception: Attention normal.         Mood and Affect: Mood normal.         ASSESSMENT:      ICD-10-CM    1. Impacted cerumen of left ear  H61.22         PLAN:  Pain in left ear is experienced by the patient after syringing, on exam there is erythema on the tympanic membrane. Small piece of cerumen is removed.  Hearing is still muffled.   Will order amoxicillin to start if symptoms of pain is getting worse  Plan of care as above  I recommend follow up with PCP in 3 days or sooner if symptoms are getting worse  Worrisome symptoms are discussed and instructions to go to the ER.  All questions are answered and patient verbalized understanding and agrees with this plan.  Viviana Forbes  [facility]  Family Nurse Practitoner          Patient Instructions       Patient Education     Impacted Earwax     Inner ear structures including ear canal and eardrum.   Impacted earwax is a buildup of the natural wax in the ear. Impacted earwax is very common. It can cause symptoms such as hearing loss. It can also make it hard for a healthcare provider to check your ear.   Understanding earwax  Tiny glands in your ear make substances that combine with dead skin cells to form earwax. Earwax helps protect your ear canal from water, dirt, infection, and injury. Over time, earwax travels from the inner part of your ear canal to the  entrance of the canal. Then it falls away naturally. But in some cases, it can t travel to the entrance of the canal. This may be because of a health condition or objects put in the ear. With age, earwax tends to become harder and less fluid. Older adults are more likely to have problems with earwax buildup.   What causes impacted earwax?  Earwax can build up because of many health conditions. Some cause a physical blockage. Others cause too much earwax to be made. Health conditions that can cause earwax buildup include:     Bony blockage in the ear (osteoma or exostoses)    Infections, such as an outer ear infection (external otitis)    Skin disease, such as eczema    Autoimmune diseases, such as lupus    A narrowed ear canal from birth, chronic inflammation, or injury    Too much earwax because of injury    Too much earwax because of  water in the ear canal  Putting objects in the ear again and again can also cause impacted earwax. For example, putting cotton swabs in the ear may push the wax deeper into the ear. Over time, this may cause blockage. Hearing aids, swimming plugs, and swim molds can also cause this problem when used again and again.   In some cases, the cause of impacted earwax is not known.  Symptoms of impacted earwax  Excess earwax often does not cause any symptoms, unless there is a large amount of buildup. Then it may cause symptoms such as:     Hearing loss    Earache    Sense of ear fullness    Itching in the ear    Odor from the ear    Ear drainage    Dizziness    Ringing in the ears    Cough  Treatment for impacted earwax  If you don t have symptoms, you may not need treatment. Often the earwax goes away on its own with time. If you have symptoms, you may have 1 or more treatments such as:     Ear drops to soften the earwax. This helps it leave the ear over time.    Rinsing the ear canal with water. This is done in a healthcare provider s office.    Removing the earwax with small tools. This  is also done in a provider s office.  In rare cases, some treatments for earwax removal may cause complications such as:    Outer ear infection    Earache    Short-term hearing loss    Dizziness    Water trapped in the ear canal    Hole in the eardrum    Ringing in the ears    Bleeding from the ear  Talk with your healthcare provider about which risks apply most to you.  Healthcare providers don't advise using ear candles or ear vacuum kits. These methods are not shown to work and may cause problems.   Preventing impacted earwax  You may not be able to prevent impacted earwax if you have a health condition that causes it, such as eczema. In other cases, you may be able to prevent earwax buildup by:     Using ear drops once a week    Having a regular ear cleaning about every 6 months    Not using cotton swabs in the ear  When to call the healthcare provider  Call your healthcare provider right away if you have:     Symptoms of impacted earwax    Severe symptoms after earwax removal, such as bleeding or severe ear pain    Jose Francisco last reviewed this educational content on 9/1/2019 2000-2021 [employer]. All rights reserved. This information is not intended as a substitute for professional medical care. Always follow your healthcare professional's instructions.

## 2022-08-31 NOTE — TELEPHONE ENCOUNTER
Patient/parent is informed of MD note below, as it is written. Verbalized good understanding.  Sharonda Natarajan RN      Subjective:    CC: Pain and Swelling of the Right Knee (Had aspiration and injection  on 8/18/22.  Pain in the right was better started hurting again. Having Left knee pain that started before she saw you.  To painful to do exercise. Taking diclofenac which helps.)       HPI:  Patient returns today for repeat exam.  Patient states the aspiration injection for right knee help though her swelling has returned.  She continues to have pain with daily activities.    ROS: Refer to HPI for pertinent ROS. All other 12 point systems negative.    Objective:    Physical Exam:  The patient is well-nourished, well-developed and in no apparent distress, pleasant and cooperative. Examination of the right lower extremity compartments are soft and warm. Skin is intact. There are no signs or symptoms of DVT or infection. There is a moderate joint effusion. There is no erythema. Tender to palpation along the medial joint line , right knee range of motion is 0-100. The knee is stable to exam with varus and valgus stressing. Negative anterior and posterior drawer. Negative Lachman´s.  Negative Jalen's test. Patella grind is positive, Negative for apprehension. Neurovascularly intact distally.    Images: . Images Reviewed and discussed with patient.    Assessment:  1. Knee effusion, right  - Large Joint Aspiration/Injection: R knee    2. Primary osteoarthritis of both knees  - Large Joint Aspiration/Injection: R knee        Plan:  At this time we discussed her physical exam and previous imaging findings.  Under sterile technique she tolerated aspiration injection very well, approximately 35 cc of serosanguineous fluid was removed.  She received her steroid lidocaine injection.  We have discussed low-impact activities.  We have discussed a total knee arthroplasty down the road if needed.  Otherwise I would like see back in 2 weeks to see how she is progressing.    Follow UP: No follow-ups on file.    Large Joint Aspiration/Injection: R  knee    Date/Time: 8/31/2022 1:15 PM  Performed by: Dev Weaver MD  Authorized by: Dev Weaver MD     Consent Done?:  Yes (Verbal)  Indications:  Pain  Site marked: the procedure site was marked    Prep: patient was prepped and draped in usual sterile fashion    Approach:  Anterolateral  Location:  Knee  Site:  R knee  Medications:  12 mg betamethasone acetate-betamethasone sodium phosphate 6 mg/mL; 3 mg LIDOcaine HCL 20 mg/ml (2%) 20 mg/mL (2 %)  Patient tolerance:  Patient tolerated the procedure well with no immediate complications

## 2024-09-07 ENCOUNTER — HEALTH MAINTENANCE LETTER (OUTPATIENT)
Age: 39
End: 2024-09-07